# Patient Record
Sex: FEMALE | Race: BLACK OR AFRICAN AMERICAN | NOT HISPANIC OR LATINO | URBAN - METROPOLITAN AREA
[De-identification: names, ages, dates, MRNs, and addresses within clinical notes are randomized per-mention and may not be internally consistent; named-entity substitution may affect disease eponyms.]

---

## 2021-08-02 ENCOUNTER — EMERGENCY (EMERGENCY)
Facility: HOSPITAL | Age: 46
LOS: 1 days | Discharge: HOME | End: 2021-08-02
Attending: EMERGENCY MEDICINE | Admitting: EMERGENCY MEDICINE
Payer: MEDICAID

## 2021-08-02 VITALS
OXYGEN SATURATION: 100 % | RESPIRATION RATE: 20 BRPM | TEMPERATURE: 99 F | HEART RATE: 105 BPM | SYSTOLIC BLOOD PRESSURE: 141 MMHG | DIASTOLIC BLOOD PRESSURE: 86 MMHG

## 2021-08-02 LAB
APPEARANCE UR: CLEAR — SIGNIFICANT CHANGE UP
BACTERIA # UR AUTO: ABNORMAL
BASE EXCESS BLDV CALC-SCNC: 3.3 MMOL/L — HIGH (ref -3–2)
BILIRUB UR-MCNC: NEGATIVE — SIGNIFICANT CHANGE UP
BLOOD GAS VENOUS - CREATININE: 0.9 MG/DL — SIGNIFICANT CHANGE UP (ref 0.5–1.3)
BLOOD GAS VENOUS COMPREHENSIVE RESULT: SIGNIFICANT CHANGE UP
CHLORIDE BLDV-SCNC: 101 MMOL/L — SIGNIFICANT CHANGE UP (ref 96–108)
COLOR SPEC: COLORLESS — SIGNIFICANT CHANGE UP
DIFF PNL FLD: ABNORMAL
EPI CELLS # UR: 1 /HPF — SIGNIFICANT CHANGE UP (ref 0–5)
GAS PNL BLDV: 133 MMOL/L — LOW (ref 136–146)
GLUCOSE BLDV-MCNC: 189 MG/DL — HIGH (ref 70–99)
GLUCOSE UR QL: NEGATIVE — SIGNIFICANT CHANGE UP
HCO3 BLDV-SCNC: 27 MMOL/L — SIGNIFICANT CHANGE UP (ref 20–27)
HCT VFR BLD CALC: 37.6 % — SIGNIFICANT CHANGE UP (ref 34.5–45)
HCT VFR BLDA CALC: 39.8 % — SIGNIFICANT CHANGE UP (ref 34.5–46.5)
HGB BLD CALC-MCNC: 12.9 G/DL — SIGNIFICANT CHANGE UP (ref 11.5–15.5)
HGB BLD-MCNC: 12.5 G/DL — SIGNIFICANT CHANGE UP (ref 11.5–15.5)
HYALINE CASTS # UR AUTO: 0 /LPF — SIGNIFICANT CHANGE UP (ref 0–7)
KETONES UR-MCNC: NEGATIVE — SIGNIFICANT CHANGE UP
LACTATE BLDV-MCNC: 1.7 MMOL/L — SIGNIFICANT CHANGE UP (ref 0.5–2)
LEUKOCYTE ESTERASE UR-ACNC: ABNORMAL
MCHC RBC-ENTMCNC: 27.5 PG — SIGNIFICANT CHANGE UP (ref 27–34)
MCHC RBC-ENTMCNC: 33.2 GM/DL — SIGNIFICANT CHANGE UP (ref 32–36)
MCV RBC AUTO: 82.8 FL — SIGNIFICANT CHANGE UP (ref 80–100)
NITRITE UR-MCNC: NEGATIVE — SIGNIFICANT CHANGE UP
NRBC # BLD: 0 /100 WBCS — SIGNIFICANT CHANGE UP
NRBC # FLD: 0 K/UL — SIGNIFICANT CHANGE UP
PCO2 BLDV: 42 MMHG — SIGNIFICANT CHANGE UP (ref 41–51)
PH BLDV: 7.43 — SIGNIFICANT CHANGE UP (ref 7.32–7.43)
PH UR: 6.5 — SIGNIFICANT CHANGE UP (ref 5–8)
PLATELET # BLD AUTO: 158 K/UL — SIGNIFICANT CHANGE UP (ref 150–400)
PO2 BLDV: 38 MMHG — SIGNIFICANT CHANGE UP (ref 35–40)
POTASSIUM BLDV-SCNC: 2.8 MMOL/L — CRITICAL LOW (ref 3.4–4.5)
PROT UR-MCNC: ABNORMAL
RBC # BLD: 4.54 M/UL — SIGNIFICANT CHANGE UP (ref 3.8–5.2)
RBC # FLD: 12.2 % — SIGNIFICANT CHANGE UP (ref 10.3–14.5)
RBC CASTS # UR COMP ASSIST: 2 /HPF — SIGNIFICANT CHANGE UP (ref 0–4)
SAO2 % BLDV: 67.6 % — SIGNIFICANT CHANGE UP (ref 60–85)
SP GR SPEC: 1.01 — LOW (ref 1.01–1.02)
UROBILINOGEN FLD QL: SIGNIFICANT CHANGE UP
WBC # BLD: 13.28 K/UL — HIGH (ref 3.8–10.5)
WBC # FLD AUTO: 13.28 K/UL — HIGH (ref 3.8–10.5)
WBC UR QL: 30 /HPF — HIGH (ref 0–5)

## 2021-08-02 PROCEDURE — 99284 EMERGENCY DEPT VISIT MOD MDM: CPT

## 2021-08-02 RX ORDER — SODIUM CHLORIDE 9 MG/ML
1000 INJECTION, SOLUTION INTRAVENOUS ONCE
Refills: 0 | Status: COMPLETED | OUTPATIENT
Start: 2021-08-02 | End: 2021-08-02

## 2021-08-02 RX ORDER — CEFTRIAXONE 500 MG/1
1000 INJECTION, POWDER, FOR SOLUTION INTRAMUSCULAR; INTRAVENOUS ONCE
Refills: 0 | Status: COMPLETED | OUTPATIENT
Start: 2021-08-02 | End: 2021-08-02

## 2021-08-02 RX ADMIN — SODIUM CHLORIDE 1000 MILLILITER(S): 9 INJECTION, SOLUTION INTRAVENOUS at 22:21

## 2021-08-02 NOTE — ED PROVIDER NOTE - NSFOLLOWUPINSTRUCTIONS_ED_ALL_ED_FT
YOU WERE SEEN IN THE ED FOR: urinary symptoms     YOUR POTASSIUM WAS LOW. IT WAS REPLETED.    YOU WERE PRESCRIBED: CEFUROXIME  FOLLOW THE INSTRUCTIONS ON THE LABEL/CONTAINER    FOR PAIN/FEVER, YOU MAY TAKE TYLENOL (acetaminophen) AND/OR IBUPROFEN (advil or motrin). FOLLOW THE INSTRUCTIONS ON THE LABEL/CONTAINER.    PLEASE FOLLOW UP WITH YOUR PRIVATE PHYSICIAN WITHIN THE NEXT 48 HOURS. BRING COPIES OF YOUR RESULTS.    RETURN TO THE EMERGENCY DEPARTMENT IF YOU EXPERIENCE ANY NEW/CONCERNING/WORSENING SYMPTOMS.

## 2021-08-02 NOTE — ED PROVIDER NOTE - PHYSICAL EXAMINATION
Alert and oriented, NAD   HEENT nml   perrl 2-12 intact   neck supple   heart s1s2,   lungs clear,   abd soft mild suprapubic tenderness,   no CVA tenderness   no rashes, motor 5/5. sensation intact.

## 2021-08-02 NOTE — ED ADULT NURSE NOTE - OBJECTIVE STATEMENT
PT is a 46 year old female reporting to the ED for headache, chills since she received 2nd dose pfizer on 7/26. Went to urgent care today and told to come to ED for fever, received 1000 mg tylenol at 1800 at urgent care which relieved fever and headache. Pt is AOX4. Pt denies chest pain or SOB. PT respirations even an unlabored. Pt appears to be comfortable, in NAD. Pt denies n/v/d. Pt denies abdominal pain, dysuria, hematuria. 20 g iv placed in r ac, labs drawn, pending review, will continue to monitor.

## 2021-08-02 NOTE — ED ADULT TRIAGE NOTE - CHIEF COMPLAINT QUOTE
received 2nd dose pfizer on 7/26, c/o fever chills myalgias and HA x 1 week, went to [facility] care and was told to come to ED for further eval  received 1000mg tylenol at 18:00

## 2021-08-02 NOTE — ED PROVIDER NOTE - OBJECTIVE STATEMENT
46 yr old female c/o 1 week hx of chills body aches, headache with fever, dysuria and frequency, recent covid vaccine july 24. Seen at [facility] with temp 103, neg covid swab, positive urine. No hx of UTI, no vag discharge, no flank pain, nausea or vomiting.  No headache at this time

## 2021-08-02 NOTE — ED PROVIDER NOTE - ATTENDING CONTRIBUTION TO CARE
I performed the initial face to face bedside interview with this patient regarding history of present illness, review of symptoms and past medical, social and family history.  I completed an independent physical examination.  I was the initial provider who evaluated this patient.  The history, review of symptoms and examination was documented by me.  I have discussed the patient’s plan of care and disposition with the resident. Dr Bloch-I performed the initial face to face bedside interview with this patient regarding history of present illness, review of symptoms and past medical, social and family history.  I completed an independent physical examination.  I was the initial provider who evaluated this patient.  The history, review of symptoms and examination was documented by me.  I have discussed the patient’s plan of care and disposition with the resident.

## 2021-08-02 NOTE — ED PROVIDER NOTE - CLINICAL SUMMARY MEDICAL DECISION MAKING FREE TEXT BOX
46 yr old female with fever, HA, dysuria, with recent 2nd dose of COVID vaccine, concern for pyelonephritis, viral syndrome,  labs , IV fluids, ua reassess

## 2021-08-02 NOTE — ED PROVIDER NOTE - PROGRESS NOTE DETAILS
Tarik Pham D.O., PGY3 (Resident)  Patient feels improved. Results endorsed. Potassium repleted. Return precautions given. Patient expressed verbal understanding. All questions answered.  Will DC with outpatient follow-up.

## 2021-08-02 NOTE — ED PROVIDER NOTE - PATIENT PORTAL LINK FT
You can access the FollowMyHealth Patient Portal offered by [facility] by registering at the following website: [email or web address]. By joining [facility]’s FollowMyHealth portal, you will also be able to view your health information using other applications (apps) compatible with our system.

## 2021-08-02 NOTE — ED ADULT NURSE NOTE - CHIEF COMPLAINT QUOTE
received 2nd dose pfizer on 7/26, c/o fever chills myalgias and HA x 1 week, went to [facility] care and was told to come to ED for further eval  received 1000mg tylenol at 18:00

## 2021-08-03 VITALS
OXYGEN SATURATION: 100 % | TEMPERATURE: 98 F | DIASTOLIC BLOOD PRESSURE: 88 MMHG | RESPIRATION RATE: 18 BRPM | SYSTOLIC BLOOD PRESSURE: 124 MMHG | HEART RATE: 89 BPM

## 2021-08-03 LAB
ALBUMIN SERPL ELPH-MCNC: 4.3 G/DL — SIGNIFICANT CHANGE UP (ref 3.3–5)
ALP SERPL-CCNC: 79 U/L — SIGNIFICANT CHANGE UP (ref 40–120)
ALT FLD-CCNC: 31 U/L — SIGNIFICANT CHANGE UP (ref 4–33)
ANION GAP SERPL CALC-SCNC: 15 MMOL/L — HIGH (ref 7–14)
AST SERPL-CCNC: 25 U/L — SIGNIFICANT CHANGE UP (ref 4–32)
B PERT DNA SPEC QL NAA+PROBE: SIGNIFICANT CHANGE UP
BILIRUB SERPL-MCNC: 0.6 MG/DL — SIGNIFICANT CHANGE UP (ref 0.2–1.2)
BUN SERPL-MCNC: 14 MG/DL — SIGNIFICANT CHANGE UP (ref 7–23)
C PNEUM DNA SPEC QL NAA+PROBE: SIGNIFICANT CHANGE UP
CALCIUM SERPL-MCNC: 9.3 MG/DL — SIGNIFICANT CHANGE UP (ref 8.4–10.5)
CHLORIDE SERPL-SCNC: 96 MMOL/L — LOW (ref 98–107)
CO2 SERPL-SCNC: 23 MMOL/L — SIGNIFICANT CHANGE UP (ref 22–31)
CREAT SERPL-MCNC: 0.82 MG/DL — SIGNIFICANT CHANGE UP (ref 0.5–1.3)
FLUAV H1 2009 PAND RNA SPEC QL NAA+PROBE: SIGNIFICANT CHANGE UP
FLUAV H1 RNA SPEC QL NAA+PROBE: SIGNIFICANT CHANGE UP
FLUAV H3 RNA SPEC QL NAA+PROBE: SIGNIFICANT CHANGE UP
FLUAV SUBTYP SPEC NAA+PROBE: SIGNIFICANT CHANGE UP
FLUBV RNA SPEC QL NAA+PROBE: SIGNIFICANT CHANGE UP
GLUCOSE SERPL-MCNC: 178 MG/DL — HIGH (ref 70–99)
HADV DNA SPEC QL NAA+PROBE: SIGNIFICANT CHANGE UP
HCOV 229E RNA SPEC QL NAA+PROBE: SIGNIFICANT CHANGE UP
HCOV HKU1 RNA SPEC QL NAA+PROBE: SIGNIFICANT CHANGE UP
HCOV NL63 RNA SPEC QL NAA+PROBE: SIGNIFICANT CHANGE UP
HCOV OC43 RNA SPEC QL NAA+PROBE: SIGNIFICANT CHANGE UP
HMPV RNA SPEC QL NAA+PROBE: SIGNIFICANT CHANGE UP
HPIV1 RNA SPEC QL NAA+PROBE: SIGNIFICANT CHANGE UP
HPIV2 RNA SPEC QL NAA+PROBE: SIGNIFICANT CHANGE UP
HPIV3 RNA SPEC QL NAA+PROBE: SIGNIFICANT CHANGE UP
HPIV4 RNA SPEC QL NAA+PROBE: SIGNIFICANT CHANGE UP
POTASSIUM SERPL-MCNC: 3 MMOL/L — LOW (ref 3.5–5.3)
POTASSIUM SERPL-SCNC: 3 MMOL/L — LOW (ref 3.5–5.3)
PROT SERPL-MCNC: 8.1 G/DL — SIGNIFICANT CHANGE UP (ref 6–8.3)
RAPID RVP RESULT: SIGNIFICANT CHANGE UP
RSV RNA SPEC QL NAA+PROBE: SIGNIFICANT CHANGE UP
RV+EV RNA SPEC QL NAA+PROBE: SIGNIFICANT CHANGE UP
SARS-COV-2 RNA SPEC QL NAA+PROBE: SIGNIFICANT CHANGE UP
SODIUM SERPL-SCNC: 134 MMOL/L — LOW (ref 135–145)

## 2021-08-03 RX ORDER — CEFUROXIME AXETIL 250 MG
1 TABLET ORAL
Qty: 14 | Refills: 0
Start: 2021-08-03 | End: 2021-08-09

## 2021-08-03 RX ORDER — POTASSIUM CHLORIDE 20 MEQ
10 PACKET (EA) ORAL
Refills: 0 | Status: COMPLETED | OUTPATIENT
Start: 2021-08-03 | End: 2021-08-03

## 2021-08-03 RX ORDER — POTASSIUM CHLORIDE 20 MEQ
40 PACKET (EA) ORAL ONCE
Refills: 0 | Status: COMPLETED | OUTPATIENT
Start: 2021-08-03 | End: 2021-08-03

## 2021-08-03 RX ADMIN — Medication 100 MILLIEQUIVALENT(S): at 00:23

## 2021-08-03 RX ADMIN — Medication 40 MILLIEQUIVALENT(S): at 00:23

## 2021-08-03 RX ADMIN — CEFTRIAXONE 100 MILLIGRAM(S): 500 INJECTION, POWDER, FOR SOLUTION INTRAMUSCULAR; INTRAVENOUS at 00:01

## 2021-08-06 LAB
-  ESBL: SIGNIFICANT CHANGE UP
E COLI DNA BLD POS QL NAA+NON-PROBE: SIGNIFICANT CHANGE UP
GRAM STN FLD: SIGNIFICANT CHANGE UP
METHOD TYPE: SIGNIFICANT CHANGE UP

## 2021-08-06 RX ORDER — NITROFURANTOIN MACROCRYSTAL 50 MG
1 CAPSULE ORAL
Qty: 14 | Refills: 0
Start: 2021-08-06 | End: 2021-08-12

## 2021-08-06 NOTE — ED POST DISCHARGE NOTE - RESULT SUMMARY
PA orrico: + UTI E coli ESBL. SW ID macrobid sensitive will give 7 days and dc the  cefoxitin.  If any worsening sx's will return to ED> FEeling ipmrovement from ED visit.  Will repeat a urine once AB done with her pcp

## 2021-08-06 NOTE — ED POST DISCHARGE NOTE - DETAILS

## 2021-08-08 ENCOUNTER — INPATIENT (INPATIENT)
Facility: HOSPITAL | Age: 46
LOS: 1 days | Discharge: HOME HEALTH | End: 2021-08-10
Attending: INTERNAL MEDICINE | Admitting: INTERNAL MEDICINE
Payer: MEDICAID

## 2021-08-08 VITALS
DIASTOLIC BLOOD PRESSURE: 72 MMHG | HEART RATE: 75 BPM | RESPIRATION RATE: 16 BRPM | TEMPERATURE: 97 F | SYSTOLIC BLOOD PRESSURE: 115 MMHG | OXYGEN SATURATION: 100 %

## 2021-08-08 DIAGNOSIS — R78.81 BACTEREMIA: ICD-10-CM

## 2021-08-08 DIAGNOSIS — Z29.9 ENCOUNTER FOR PROPHYLACTIC MEASURES, UNSPECIFIED: ICD-10-CM

## 2021-08-08 DIAGNOSIS — E87.6 HYPOKALEMIA: ICD-10-CM

## 2021-08-08 DIAGNOSIS — N39.0 URINARY TRACT INFECTION, SITE NOT SPECIFIED: ICD-10-CM

## 2021-08-08 LAB
-  AMIKACIN: SIGNIFICANT CHANGE UP
-  AMPICILLIN/SULBACTAM: SIGNIFICANT CHANGE UP
-  AMPICILLIN: SIGNIFICANT CHANGE UP
-  AZTREONAM: SIGNIFICANT CHANGE UP
-  CEFAZOLIN: SIGNIFICANT CHANGE UP
-  CEFEPIME: SIGNIFICANT CHANGE UP
-  CEFOXITIN: SIGNIFICANT CHANGE UP
-  CEFTRIAXONE: SIGNIFICANT CHANGE UP
-  CIPROFLOXACIN: SIGNIFICANT CHANGE UP
-  ERTAPENEM: SIGNIFICANT CHANGE UP
-  GENTAMICIN: SIGNIFICANT CHANGE UP
-  IMIPENEM: SIGNIFICANT CHANGE UP
-  LEVOFLOXACIN: SIGNIFICANT CHANGE UP
-  MEROPENEM: SIGNIFICANT CHANGE UP
-  PIPERACILLIN/TAZOBACTAM: SIGNIFICANT CHANGE UP
-  TOBRAMYCIN: SIGNIFICANT CHANGE UP
-  TRIMETHOPRIM/SULFAMETHOXAZOLE: SIGNIFICANT CHANGE UP
ALBUMIN SERPL ELPH-MCNC: 4.1 G/DL — SIGNIFICANT CHANGE UP (ref 3.3–5)
ALP SERPL-CCNC: 83 U/L — SIGNIFICANT CHANGE UP (ref 40–120)
ALT FLD-CCNC: 19 U/L — SIGNIFICANT CHANGE UP (ref 4–33)
ANION GAP SERPL CALC-SCNC: 13 MMOL/L — SIGNIFICANT CHANGE UP (ref 7–14)
APPEARANCE UR: CLEAR — SIGNIFICANT CHANGE UP
AST SERPL-CCNC: 16 U/L — SIGNIFICANT CHANGE UP (ref 4–32)
BASOPHILS # BLD AUTO: 0.02 K/UL — SIGNIFICANT CHANGE UP (ref 0–0.2)
BASOPHILS NFR BLD AUTO: 0.4 % — SIGNIFICANT CHANGE UP (ref 0–2)
BILIRUB SERPL-MCNC: 0.4 MG/DL — SIGNIFICANT CHANGE UP (ref 0.2–1.2)
BILIRUB UR-MCNC: NEGATIVE — SIGNIFICANT CHANGE UP
BLOOD GAS VENOUS COMPREHENSIVE RESULT: SIGNIFICANT CHANGE UP
BUN SERPL-MCNC: 20 MG/DL — SIGNIFICANT CHANGE UP (ref 7–23)
CALCIUM SERPL-MCNC: 9.4 MG/DL — SIGNIFICANT CHANGE UP (ref 8.4–10.5)
CHLORIDE SERPL-SCNC: 98 MMOL/L — SIGNIFICANT CHANGE UP (ref 98–107)
CO2 SERPL-SCNC: 26 MMOL/L — SIGNIFICANT CHANGE UP (ref 22–31)
COLOR SPEC: SIGNIFICANT CHANGE UP
CREAT SERPL-MCNC: 0.79 MG/DL — SIGNIFICANT CHANGE UP (ref 0.5–1.3)
CULTURE RESULTS: SIGNIFICANT CHANGE UP
CULTURE RESULTS: SIGNIFICANT CHANGE UP
DIFF PNL FLD: NEGATIVE — SIGNIFICANT CHANGE UP
EOSINOPHIL # BLD AUTO: 0.06 K/UL — SIGNIFICANT CHANGE UP (ref 0–0.5)
EOSINOPHIL NFR BLD AUTO: 1.3 % — SIGNIFICANT CHANGE UP (ref 0–6)
GLUCOSE SERPL-MCNC: 90 MG/DL — SIGNIFICANT CHANGE UP (ref 70–99)
GLUCOSE UR QL: NEGATIVE — SIGNIFICANT CHANGE UP
HCT VFR BLD CALC: 37.5 % — SIGNIFICANT CHANGE UP (ref 34.5–45)
HGB BLD-MCNC: 12.4 G/DL — SIGNIFICANT CHANGE UP (ref 11.5–15.5)
IANC: 2.53 K/UL — SIGNIFICANT CHANGE UP (ref 1.5–8.5)
IMM GRANULOCYTES NFR BLD AUTO: 0.6 % — SIGNIFICANT CHANGE UP (ref 0–1.5)
KETONES UR-MCNC: NEGATIVE — SIGNIFICANT CHANGE UP
LEUKOCYTE ESTERASE UR-ACNC: NEGATIVE — SIGNIFICANT CHANGE UP
LYMPHOCYTES # BLD AUTO: 1.62 K/UL — SIGNIFICANT CHANGE UP (ref 1–3.3)
LYMPHOCYTES # BLD AUTO: 34.2 % — SIGNIFICANT CHANGE UP (ref 13–44)
MCHC RBC-ENTMCNC: 27.6 PG — SIGNIFICANT CHANGE UP (ref 27–34)
MCHC RBC-ENTMCNC: 33.1 GM/DL — SIGNIFICANT CHANGE UP (ref 32–36)
MCV RBC AUTO: 83.5 FL — SIGNIFICANT CHANGE UP (ref 80–100)
METHOD TYPE: SIGNIFICANT CHANGE UP
MONOCYTES # BLD AUTO: 0.47 K/UL — SIGNIFICANT CHANGE UP (ref 0–0.9)
MONOCYTES NFR BLD AUTO: 9.9 % — SIGNIFICANT CHANGE UP (ref 2–14)
NEUTROPHILS # BLD AUTO: 2.53 K/UL — SIGNIFICANT CHANGE UP (ref 1.8–7.4)
NEUTROPHILS NFR BLD AUTO: 53.6 % — SIGNIFICANT CHANGE UP (ref 43–77)
NITRITE UR-MCNC: NEGATIVE — SIGNIFICANT CHANGE UP
NRBC # BLD: 0 /100 WBCS — SIGNIFICANT CHANGE UP
NRBC # FLD: 0 K/UL — SIGNIFICANT CHANGE UP
ORGANISM # SPEC MICROSCOPIC CNT: SIGNIFICANT CHANGE UP
PH UR: 7 — SIGNIFICANT CHANGE UP (ref 5–8)
PLATELET # BLD AUTO: 256 K/UL — SIGNIFICANT CHANGE UP (ref 150–400)
POTASSIUM SERPL-MCNC: 3.2 MMOL/L — LOW (ref 3.5–5.3)
POTASSIUM SERPL-SCNC: 3.2 MMOL/L — LOW (ref 3.5–5.3)
PROT SERPL-MCNC: 7.6 G/DL — SIGNIFICANT CHANGE UP (ref 6–8.3)
PROT UR-MCNC: NEGATIVE — SIGNIFICANT CHANGE UP
RBC # BLD: 4.49 M/UL — SIGNIFICANT CHANGE UP (ref 3.8–5.2)
RBC # FLD: 12.2 % — SIGNIFICANT CHANGE UP (ref 10.3–14.5)
SARS-COV-2 RNA SPEC QL NAA+PROBE: SIGNIFICANT CHANGE UP
SODIUM SERPL-SCNC: 137 MMOL/L — SIGNIFICANT CHANGE UP (ref 135–145)
SP GR SPEC: 1.01 — SIGNIFICANT CHANGE UP (ref 1.01–1.02)
SPECIMEN SOURCE: SIGNIFICANT CHANGE UP
SPECIMEN SOURCE: SIGNIFICANT CHANGE UP
UROBILINOGEN FLD QL: SIGNIFICANT CHANGE UP
WBC # BLD: 4.73 K/UL — SIGNIFICANT CHANGE UP (ref 3.8–10.5)
WBC # FLD AUTO: 4.73 K/UL — SIGNIFICANT CHANGE UP (ref 3.8–10.5)

## 2021-08-08 PROCEDURE — 99285 EMERGENCY DEPT VISIT HI MDM: CPT

## 2021-08-08 PROCEDURE — 99222 1ST HOSP IP/OBS MODERATE 55: CPT

## 2021-08-08 RX ORDER — ACETAMINOPHEN 500 MG
650 TABLET ORAL EVERY 6 HOURS
Refills: 0 | Status: DISCONTINUED | OUTPATIENT
Start: 2021-08-08 | End: 2021-08-10

## 2021-08-08 RX ORDER — ERTAPENEM SODIUM 1 G/1
1000 INJECTION, POWDER, LYOPHILIZED, FOR SOLUTION INTRAMUSCULAR; INTRAVENOUS EVERY 24 HOURS
Refills: 0 | Status: DISCONTINUED | OUTPATIENT
Start: 2021-08-09 | End: 2021-08-10

## 2021-08-08 RX ORDER — ERTAPENEM SODIUM 1 G/1
1000 INJECTION, POWDER, LYOPHILIZED, FOR SOLUTION INTRAMUSCULAR; INTRAVENOUS ONCE
Refills: 0 | Status: COMPLETED | OUTPATIENT
Start: 2021-08-08 | End: 2021-08-08

## 2021-08-08 RX ORDER — ENOXAPARIN SODIUM 100 MG/ML
40 INJECTION SUBCUTANEOUS DAILY
Refills: 0 | Status: DISCONTINUED | OUTPATIENT
Start: 2021-08-08 | End: 2021-08-10

## 2021-08-08 RX ORDER — POTASSIUM CHLORIDE 20 MEQ
40 PACKET (EA) ORAL ONCE
Refills: 0 | Status: COMPLETED | OUTPATIENT
Start: 2021-08-08 | End: 2021-08-08

## 2021-08-08 RX ADMIN — Medication 40 MILLIEQUIVALENT(S): at 17:03

## 2021-08-08 RX ADMIN — ERTAPENEM SODIUM 120 MILLIGRAM(S): 1 INJECTION, POWDER, LYOPHILIZED, FOR SOLUTION INTRAMUSCULAR; INTRAVENOUS at 16:46

## 2021-08-08 NOTE — H&P ADULT - HISTORY OF PRESENT ILLNESS
46 year old female with no significant history except for a recent UTI is here for bacteremia. Patient had her second COVID pfizer vaccine on 07/26 and she started having headache, bodyache, chills. She thought she had COVID and went to the urgent care. At urgent care, she was COVID and flu negative but was febrile and found to have UTI and was sent to [facility] ER for treatment. She endorsed dysuria, urinary frequency, urgency and back pain. She was discharged home on cefuroxime. She was doing well when she received a call yesterday stating that her antibiotic will be changed to macrobid and that she has bacteremia and to return to the emergency room for IV antibiotics. Patient states that her urinary symptoms resolved. She still feels a 3-4/10 constant headache, takes tylenol PRN and has mild dizziness and feels lightheaded. Denies fever, chills, chest pain, palpitation, SOB, N/V, abdominal pain. No other hx of UTI. Blood cultures from 08/02 showed ESBL e.coli. Urine culture from 08/03 showed 50-99k ESBL e.coli. She received ertapenem 1 gm x1. Patient is being admitted for e.coli bacteremia.      46F with GERD/H pylori s/p treatment (1+ yr prior) recent presentation for UTI sx dc on PO abx called back for ESBL E coli bacteremia. Patient had her second COVID pfizer vaccine on 07/26 and she started having headache, body ache chills. She thought she had COVID and went to the urgent care. At urgent care, she was COVID and flu negative but was febrile and found to have UTI and was sent to [facility] ER for treatment. She endorsed dysuria, urinary frequency, urgency and back pain (does not recall side). She was discharged home on cefuroxime. She was doing well when she received a call yesterday stating that her antibiotic will be changed to macrobid and that she has bacteremia and to return to the emergency room for IV antibiotics. Patient states that her urinary symptoms resolved. Never started the macrobid that was prescribed.  She still feels a 3-4/10 constant headache, takes Tylenol PRN and has mild dizziness and feels lightheaded. Denies fever, chills, chest pain, palpitation, SOB, N/V, abdominal pain. No other hx of UTI.  No recent abx use.    Blood cultures from 08/02 showed ESBL e.coli. Urine culture from 08/03 showed 50-99k ESBL e.coli. She received ertapenem 1 gm x1. Patient is being admitted for e.coli bacteremia.

## 2021-08-08 NOTE — ED PROVIDER NOTE - ATTENDING CONTRIBUTION TO CARE
Attending note:   After face to face evaluation of this patient, I concur with above noted hx, pe, and care plan for this patient.  Vu: 46 yof with recent UTI treated with oral antibiotics but then changed to another. Pt then called for positive blood culture. Pt initially had headache, fever, dysuria but had also received recent covid vaccine and attributed sxs to that. Now symptoms have improved but still present. Pt is well appearing, no distress, no meningismus, clear lungs, RRR, abd soft and non tender, no edema, no CVAT, no suprapubic tn. Pt with known bacteremia - but now feeling better, likely needs IV antibxs but will discuss with ID for treatment of ESBL.

## 2021-08-08 NOTE — H&P ADULT - NSHPSOCIALHISTORY_GEN_ALL_CORE
Denies ETOH use/abuse, smoking or illegal drug use.     Family history:  Mother - HTN  Father - brain tumor Denies ETOH use/abuse, smoking or illegal drug use.

## 2021-08-08 NOTE — H&P ADULT - ASSESSMENT
46 year old female with no significant history except for a recent UTI is here for bacteremia. 46F no significant history except for a recent UTI is here for ESBL E.coli bacteremia.

## 2021-08-08 NOTE — H&P ADULT - ATTENDING COMMENTS
Patient seen and examined care d/w ACP    In summary 47 yo F with GERD 2/2 H Pylori s/p treatment 1 year prior who presented 8/2 with fever and dysuria dx UTI and dc on cefuroxime now called back with urine and blood cultures were positive for ESBL bacteremia due to urinary source.    # ESBL Bacteremia/UTI/Pyelo:  despite abx being not sensitive pt has had clinical improvement, denies further fevers, dysuria, or flank pain; no prior UTI, no recent abx use or use multiple times (last for H pylori); WBC resolved.  No oral options for abx.  - c/w ertapenem D1 (8/8)  - f/u repeat blood cultures from 8/8  - would curbsided ID re: duration, classically treated for 10-14 days for bacteremia however pt oddly clinically improved on abx that did not "cover" her infxn, if rec 10d course will need midline and CM re: insurance coverage for ertapenem     # Hypokalemia: repleted    No PT needs, low improve, no chemical ppx ambulatory

## 2021-08-08 NOTE — ED PROVIDER NOTE - OBJECTIVE STATEMENT
46yF w/no stated pmhx called back to ED with positive blood culture drawn on 8/02. Pt was seen in the ED on 8/02 with fever, headache, dysuria and diagnosed with UTI discharged of cefuroxime. Urine culture results ESBL+, antibiotic changed to Macrobid. Pt was called on 8/06 with positive blood culture results (gram negative rods in anaerobic bottle) and instructed to return to the ED. Pt states she has been afebrile since ED visit on 8/02, improvement in urinary complaints although she continues to have mild headache, dizziness and generalized weakness.

## 2021-08-08 NOTE — ED ADULT NURSE REASSESSMENT NOTE - NS ED NURSE REASSESS COMMENT FT1
Pt in NAD, breathing even and unlabored, afebrile, denies chest pain, no headache/dizziness, able to tolerate PO, IVL is clear and patent, ambulatory without assist. Will continue to monitor.

## 2021-08-08 NOTE — H&P ADULT - PROBLEM SELECTOR PLAN 1
- patient with a recent UTI is here for ESBL e.coli bacteremia   - likely due to a recent UTI  - repeat UA negative for infection  - urinary symptoms now resolved   - continue ertapenem   - repeat blood cultures x2 sent on 08/08   - call house ID consult in AM

## 2021-08-08 NOTE — H&P ADULT - NSHPPHYSICALEXAM_GEN_ALL_CORE
T(C): 36.4 (08-08-21 @ 18:07), Max: 36.6 (08-08-21 @ 15:30)  HR: 73 (08-08-21 @ 18:07) (71 - 75)  BP: 128/65 (08-08-21 @ 18:07) (115/72 - 132/71)  RR: 16 (08-08-21 @ 18:07) (16 - 16)  SpO2: 100% (08-08-21 @ 18:07) (100% - 100%)

## 2021-08-08 NOTE — ED PROVIDER NOTE - CLINICAL SUMMARY MEDICAL DECISION MAKING FREE TEXT BOX
46yF w/no stated pmhx called back to ED with positive blood culture drawn on 8/02. Pt seen in ED on 8/02 dx with UTI prescribed cefuroxime, changed to Macrobid when urine culture resulted ESBL positive, blood culture terri positive for gram negative rods. Pt reports improvement in some symptoms, afebrile, no urinary complaints or back pain, continues to have mild headache and lightheadedness with fatigue. Plan: cbc/cmp, ua/ucx, rpt blood cultures, will discuss with ID

## 2021-08-08 NOTE — ED PROVIDER NOTE - PROGRESS NOTE DETAILS
SERVANDO Fermin: Paged ID for consult SERVANDO Fermin: Spoke with ID, recommending ertapenem and admission awaiting blood culture results, discussed with pt who agrees with this plan. Hospitalist paged SERVANDO Fermin: Spoke with hospitalist who accepts pt, text paged MAR

## 2021-08-08 NOTE — ED ADULT NURSE NOTE - OBJECTIVE STATEMENT
Pt a&ox3 called back for + blood cultures, pt recently receiving treatment for uti, breathing even and unlabored, afebrile, denies chest pain, no headache/dizziness, abd soft, non-tender, non-distended, skin is cool dry and intact, ivl placed, labs collected and sent.

## 2021-08-08 NOTE — ED PROVIDER NOTE - CARE PLAN
Principal Discharge DX:	UTI (urinary tract infection)  Secondary Diagnosis:	ESBL (extended spectrum beta-lactamase) producing bacteria infection  Secondary Diagnosis:	Bacteremia

## 2021-08-09 LAB
ANION GAP SERPL CALC-SCNC: 12 MMOL/L — SIGNIFICANT CHANGE UP (ref 7–14)
BUN SERPL-MCNC: 17 MG/DL — SIGNIFICANT CHANGE UP (ref 7–23)
CALCIUM SERPL-MCNC: 9.3 MG/DL — SIGNIFICANT CHANGE UP (ref 8.4–10.5)
CHLORIDE SERPL-SCNC: 98 MMOL/L — SIGNIFICANT CHANGE UP (ref 98–107)
CO2 SERPL-SCNC: 25 MMOL/L — SIGNIFICANT CHANGE UP (ref 22–31)
COVID-19 SPIKE DOMAIN AB INTERP: POSITIVE
COVID-19 SPIKE DOMAIN ANTIBODY RESULT: >250 U/ML — HIGH
CREAT SERPL-MCNC: 0.68 MG/DL — SIGNIFICANT CHANGE UP (ref 0.5–1.3)
GLUCOSE SERPL-MCNC: 115 MG/DL — HIGH (ref 70–99)
HCT VFR BLD CALC: 35.6 % — SIGNIFICANT CHANGE UP (ref 34.5–45)
HGB BLD-MCNC: 12 G/DL — SIGNIFICANT CHANGE UP (ref 11.5–15.5)
MAGNESIUM SERPL-MCNC: 2.1 MG/DL — SIGNIFICANT CHANGE UP (ref 1.6–2.6)
MCHC RBC-ENTMCNC: 27.8 PG — SIGNIFICANT CHANGE UP (ref 27–34)
MCHC RBC-ENTMCNC: 33.7 GM/DL — SIGNIFICANT CHANGE UP (ref 32–36)
MCV RBC AUTO: 82.4 FL — SIGNIFICANT CHANGE UP (ref 80–100)
NRBC # BLD: 0 /100 WBCS — SIGNIFICANT CHANGE UP
NRBC # FLD: 0 K/UL — SIGNIFICANT CHANGE UP
PHOSPHATE SERPL-MCNC: 4.3 MG/DL — SIGNIFICANT CHANGE UP (ref 2.5–4.5)
PLATELET # BLD AUTO: 236 K/UL — SIGNIFICANT CHANGE UP (ref 150–400)
POTASSIUM SERPL-MCNC: 3.3 MMOL/L — LOW (ref 3.5–5.3)
POTASSIUM SERPL-SCNC: 3.3 MMOL/L — LOW (ref 3.5–5.3)
RBC # BLD: 4.32 M/UL — SIGNIFICANT CHANGE UP (ref 3.8–5.2)
RBC # FLD: 12.3 % — SIGNIFICANT CHANGE UP (ref 10.3–14.5)
SARS-COV-2 IGG+IGM SERPL QL IA: >250 U/ML — HIGH
SARS-COV-2 IGG+IGM SERPL QL IA: POSITIVE
SODIUM SERPL-SCNC: 135 MMOL/L — SIGNIFICANT CHANGE UP (ref 135–145)
WBC # BLD: 4.58 K/UL — SIGNIFICANT CHANGE UP (ref 3.8–10.5)
WBC # FLD AUTO: 4.58 K/UL — SIGNIFICANT CHANGE UP (ref 3.8–10.5)

## 2021-08-09 PROCEDURE — 99221 1ST HOSP IP/OBS SF/LOW 40: CPT

## 2021-08-09 RX ADMIN — ERTAPENEM SODIUM 120 MILLIGRAM(S): 1 INJECTION, POWDER, LYOPHILIZED, FOR SOLUTION INTRAMUSCULAR; INTRAVENOUS at 17:47

## 2021-08-09 NOTE — CONSULT NOTE ADULT - ASSESSMENT
45yo F with history of HTN, H. pylori s/p therapy, sinus infection in February called back to hospital for ESBL E. coli bacteremia.    #ESBL E. coli bacteremia  -S/p 3d of cefuroxime and 2d of macrobid  -1/4 blood cultures + from 8/2  -Repeat blood cx pending  -C/w ertapenem 1g qd  -Will discuss if oral options exist (?cefuroxime)  -Would treat for 10d        *Recommendations pending d/w attending physician Dr. Alcala 45yo F with history of HTN, H. pylori s/p therapy, sinus infection in February called back to hospital for ESBL E. coli bacteremia.    #ESBL E. coli bacteremia  -S/p 3d of cefuroxime and 2d of macrobid  -1/4 blood cultures + from 8/2  -Repeat blood cx pending  -C/w ertapenem 1g qd  -Will need midline  -Would treat for 10d        *Recommendations pending d/w attending physician Dr. Alcala 45yo F with history of HTN, H. pylori s/p therapy, sinus infection in February called back to hospital for ESBL E. coli bacteremia.    #ESBL E. coli bacteremia  -S/p 3d of cefuroxime and 2d of macrobid  -1/4 blood cultures + from 8/2  -Repeat blood cx pending  -C/w ertapenem 1g qd  -Will need midline        *Recommendations pending d/w attending physician Dr. Alcala 45yo F with history of HTN, H. pylori s/p therapy, sinus infection in February called back to hospital for ESBL E. coli bacteremia.    #ESBL E. coli bacteremia  -S/p 3d of cefuroxime and 2d of macrobid  -1/4 blood cultures + from 8/2  -Repeat blood cx pending from 8/8  -C/w ertapenem 1g qd  -Will need midline        *Recommendations pending d/w attending physician Dr. Alcala 47yo F with history of HTN, H. pylori s/p therapy, sinus infection in February called back to hospital for ESBL E. coli bacteremia.    #ESBL E. coli bacteremia  -S/p 3d of cefuroxime and 2d of macrobid  -1/4 blood cultures + from 8/2  -Repeat blood cx pending from 8/8  -C/w ertapenem 1g qd  -Will need midline  -Plan for 7d course (8/8-8/14)        D/w attending physician Dr. Alcala

## 2021-08-09 NOTE — CONSULT NOTE ADULT - SUBJECTIVE AND OBJECTIVE BOX
Patient is a 46y old  Female who presents with a chief complaint of bacteremia (08 Aug 2021 17:46)    HPI:  46F with GERD/H pylori s/p treatment (1+ yr prior) recent presentation for UTI sx dc on PO abx called back for ESBL E coli bacteremia. Patient had her second COVID pfizer vaccine on  and she started having headache, body ache chills. She thought she had COVID and went to the urgent care. At urgent care, she was COVID and flu negative but was febrile and found to have UTI and was sent to [facility] ER for treatment. She endorsed dysuria, urinary frequency, urgency and back pain (does not recall side). She was discharged home on cefuroxime. She was doing well when she received a call yesterday stating that her antibiotic will be changed to macrobid and that she has bacteremia and to return to the emergency room for IV antibiotics. Patient states that her urinary symptoms resolved. Never started the macrobid that was prescribed.  She still feels a 3-4/10 constant headache, takes Tylenol PRN and has mild dizziness and feels lightheaded. Denies fever, chills, chest pain, palpitation, SOB, N/V, abdominal pain. No other hx of UTI.  No recent abx use.    Blood cultures from  showed ESBL e.coli. Urine culture from  showed 50-99k ESBL e.coli. She received ertapenem 1 gm x1. Patient is being admitted for e.coli bacteremia.      (08 Aug 2021 17:46)       Very pleasant lady with history of HTN, recently seen in ED for sepsis with believed urinary source, discharged on cefuroxime that was subsequently switched to macrobid after sensitivities returned. On day4 1/4 blood cultures with ESBL E. coli, called to return to ED. Reports 90% improvement since antibiotics were started. No further dysuria, fevers, or chills. Reports mild HA.    prior hospital charts reviewed [ X ]  primary team notes reviewed [  X]      PAST MEDICAL & SURGICAL HISTORY:  No pertinent past medical history    No significant past surgical history        Allergies  Allergy Status Unknown    ANTIMICROBIALS (past 90 days)  MEDICATIONS  (STANDING):  ertapenem  IVPB   120 mL/Hr IV Intermittent (21 @ 16:46)        ertapenem  IVPB 1000 every 24 hours    OTHER MEDS: MEDICATIONS  (STANDING):  acetaminophen   Tablet .. 650 every 6 hours PRN  enoxaparin Injectable 40 daily    SOCIAL HISTORY:   Denies ETOH use/abuse, smoking or illegal drug use. (08 Aug 2021 17:46)      FAMILY HISTORY:  No pertinent family history in first degree relatives      REVIEW OF SYSTEMS  [  ] ROS unobtainable because:    [ X ] All other systems negative except as noted below:	    Constitutional:  [ ] fever [ ] chills  [ ] weight loss  [ ] weakness  Skin:  [ ] rash [ ] phlebitis	  Eyes: [ ] icterus [ ] pain  [ ] discharge	  ENMT: [ ] sore throat  [ ] thrush [ ] ulcers [ ] exudates  Respiratory: [ ] dyspnea [ ] hemoptysis [ ] cough [ ] sputum	  Cardiovascular:  [ ] chest pain [ ] palpitations [ ] edema	  Gastrointestinal:  [ ] nausea [ ] vomiting [ ] diarrhea [ ] constipation [ ] pain	  Genitourinary:  [ ] dysuria [ ] frequency [ ] hematuria [ ] discharge [ ] flank pain  [ ] incontinence  Musculoskeletal:  [ ] myalgias [ ] arthralgias [ ] arthritis  [ ] back pain  Neurological:  [X ] headache [ ] seizures  [ ] confusion/altered mental status  Psychiatric:  [ ] anxiety [ ] depression	  Hematology/Lymphatics:  [ ] lymphadenopathy  Endocrine:  [ ] adrenal [ ] thyroid  Allergic/Immunologic:	 [ ] transplant [ ] seasonal    Vital Signs Last 24 Hrs  T(F): 98.6 (21 @ 04:23), Max: 98.8 (21 @ 22:24)  Vital Signs Last 24 Hrs  HR: 84 (21 @ 04:23) (71 - 84)  BP: 130/91 (21 @ 04:23) (110/67 - 132/71)  RR: 18 (21 @ 04:23)  SpO2: 100% (21 @ 04:23) (100% - 100%)  Wt(kg): --    PHYSICAL EXAM:  Constitutional: non-toxic, no distress  HEAD/EYES: anicteric, no conjunctival injection  ENT:  supple, no thrush  Cardiovascular:   normal S1, S2, no murmur, no edema  Respiratory:  clear BS bilaterally, no wheezes, no rales  GI:  soft, non-tender, normal bowel sounds  :  NO CVA tenderness or suprapubic tenderness  Musculoskeletal:  no synovitis, normal ROM  Neurologic: awake and alert, normal strength, no focal findings  Skin:  no rash, no erythema, no phlebitis  Heme/Onc: no lymphadenopathy   Psychiatric:  awake, alert, appropriate mood                            12.0   4.58  )-----------( 236      ( 09 Aug 2021 06:37 )             35.6       135  |  98  |  17  ----------------------------<  115<H>  3.3<L>   |  25  |  0.68    Ca    9.3      09 Aug 2021 06:37  Phos  4.3       Mg     2.10         TPro  7.6  /  Alb  4.1  /  TBili  0.4  /  DBili  x   /  AST  16  /  ALT  19  /  AlkPhos  83      Urinalysis Basic - ( 08 Aug 2021 15:09 )    Color: Light Yellow / Appearance: Clear / S.010 / pH: x  Gluc: x / Ketone: Negative  / Bili: Negative / Urobili: <2 mg/dL   Blood: x / Protein: Negative / Nitrite: Negative   Leuk Esterase: Negative / RBC: x / WBC x   Sq Epi: x / Non Sq Epi: x / Bacteria: x    MICROBIOLOGY:          Rapid RVP Result: [employer] ( @ 23:39)      RADIOLOGY:  imaging below personally reviewed and agree with findings

## 2021-08-09 NOTE — CONSULT NOTE ADULT - ATTENDING COMMENTS
46 year old with esbl E coli uti and bacteremia    Can change to ertapenem 1 gram iv daily   through 8/14  can give at home via midline    No need for outpatient labs.    Follow up with her pmd or ID as needed.  [phone]

## 2021-08-10 VITALS
HEART RATE: 65 BPM | TEMPERATURE: 99 F | RESPIRATION RATE: 18 BRPM | SYSTOLIC BLOOD PRESSURE: 105 MMHG | DIASTOLIC BLOOD PRESSURE: 59 MMHG | OXYGEN SATURATION: 99 %

## 2021-08-10 LAB
ANION GAP SERPL CALC-SCNC: 14 MMOL/L — SIGNIFICANT CHANGE UP (ref 7–14)
BUN SERPL-MCNC: 18 MG/DL — SIGNIFICANT CHANGE UP (ref 7–23)
CALCIUM SERPL-MCNC: 9.2 MG/DL — SIGNIFICANT CHANGE UP (ref 8.4–10.5)
CHLORIDE SERPL-SCNC: 102 MMOL/L — SIGNIFICANT CHANGE UP (ref 98–107)
CO2 SERPL-SCNC: 24 MMOL/L — SIGNIFICANT CHANGE UP (ref 22–31)
CREAT SERPL-MCNC: 0.67 MG/DL — SIGNIFICANT CHANGE UP (ref 0.5–1.3)
CULTURE RESULTS: SIGNIFICANT CHANGE UP
GLUCOSE SERPL-MCNC: 110 MG/DL — HIGH (ref 70–99)
HCT VFR BLD CALC: 35.4 % — SIGNIFICANT CHANGE UP (ref 34.5–45)
HGB BLD-MCNC: 11.7 G/DL — SIGNIFICANT CHANGE UP (ref 11.5–15.5)
MAGNESIUM SERPL-MCNC: 2.2 MG/DL — SIGNIFICANT CHANGE UP (ref 1.6–2.6)
MCHC RBC-ENTMCNC: 27.8 PG — SIGNIFICANT CHANGE UP (ref 27–34)
MCHC RBC-ENTMCNC: 33.1 GM/DL — SIGNIFICANT CHANGE UP (ref 32–36)
MCV RBC AUTO: 84.1 FL — SIGNIFICANT CHANGE UP (ref 80–100)
NRBC # BLD: 0 /100 WBCS — SIGNIFICANT CHANGE UP
NRBC # FLD: 0 K/UL — SIGNIFICANT CHANGE UP
PHOSPHATE SERPL-MCNC: 3.7 MG/DL — SIGNIFICANT CHANGE UP (ref 2.5–4.5)
PLATELET # BLD AUTO: 250 K/UL — SIGNIFICANT CHANGE UP (ref 150–400)
POTASSIUM SERPL-MCNC: 3.4 MMOL/L — LOW (ref 3.5–5.3)
POTASSIUM SERPL-SCNC: 3.4 MMOL/L — LOW (ref 3.5–5.3)
RBC # BLD: 4.21 M/UL — SIGNIFICANT CHANGE UP (ref 3.8–5.2)
RBC # FLD: 12.5 % — SIGNIFICANT CHANGE UP (ref 10.3–14.5)
SODIUM SERPL-SCNC: 140 MMOL/L — SIGNIFICANT CHANGE UP (ref 135–145)
SPECIMEN SOURCE: SIGNIFICANT CHANGE UP
WBC # BLD: 4.38 K/UL — SIGNIFICANT CHANGE UP (ref 3.8–10.5)
WBC # FLD AUTO: 4.38 K/UL — SIGNIFICANT CHANGE UP (ref 3.8–10.5)

## 2021-08-10 PROCEDURE — 99232 SBSQ HOSP IP/OBS MODERATE 35: CPT

## 2021-08-10 RX ORDER — POTASSIUM CHLORIDE 20 MEQ
40 PACKET (EA) ORAL ONCE
Refills: 0 | Status: COMPLETED | OUTPATIENT
Start: 2021-08-10 | End: 2021-08-10

## 2021-08-10 RX ORDER — SODIUM CHLORIDE 9 MG/ML
10 INJECTION INTRAMUSCULAR; INTRAVENOUS; SUBCUTANEOUS
Qty: 80 | Refills: 0
Start: 2021-08-10 | End: 2021-08-13

## 2021-08-10 RX ORDER — ERTAPENEM SODIUM 1 G/1
1 INJECTION, POWDER, LYOPHILIZED, FOR SOLUTION INTRAMUSCULAR; INTRAVENOUS
Qty: 4 | Refills: 0
Start: 2021-08-10 | End: 2021-08-13

## 2021-08-10 RX ADMIN — Medication 40 MILLIEQUIVALENT(S): at 10:19

## 2021-08-10 RX ADMIN — ERTAPENEM SODIUM 120 MILLIGRAM(S): 1 INJECTION, POWDER, LYOPHILIZED, FOR SOLUTION INTRAMUSCULAR; INTRAVENOUS at 16:10

## 2021-08-10 NOTE — PROGRESS NOTE ADULT - ASSESSMENT
46F no significant history except for a recent UTI is here for ESBL E.coli bacteremia.     Problem/Plan - 1:  ·  Problem: E Coli ESBL Bacteremia.  Plan: - patient with a recent UTI is here for ESBL e.coli bacteremia   - likely due to a recent UTI  - repeat UA negative for infection  - urinary symptoms now resolved   - continue ertapenem   - repeat blood cultures x2 sent on 08/08   - ID consulted.   -Likely Mediport for home Abxs once rpt blood cultures negative.      Problem/Plan - 2:  ·  Problem: Hypokalemia.  Plan: - repleted  - recheck in AM.      Problem/Plan - 3:  ·  Problem: DVT prophylaxis.  Plan: - Lovenox for ppx.  
46 year old with esbl E coli uti and bacteremia    Can change to ertapenem 1 gram iv daily   through 8/14  can give at home via midline    No need for outpatient labs.  Okay to place midline      Follow up with her pmd or ID as needed.  [phone]  
46F no significant history except for a recent UTI is here for ESBL E.coli bacteremia.     Problem/Plan - 1:  ·  Problem: E Coli ESBL Bacteremia.  Plan: - patient with a recent UTI is here for ESBL e.coli bacteremia   - likely due to a recent UTI  - repeat UA negative for infection  - urinary symptoms now resolved   - continue ertapenem   - repeat blood cultures x2 sent on 08/08   - ID consulted.   -Likely Mediport for home Abxs once rpt blood cultures negative.      Problem/Plan - 2:  ·  Problem: Hypokalemia.  Plan: - repleted  - recheck in AM.      Problem/Plan - 3:  ·  Problem: DVT prophylaxis.  Plan: - Lovenox for ppx.

## 2021-08-10 NOTE — DISCHARGE NOTE PROVIDER - NSDCFUADDAPPT_GEN_ALL_CORE_FT
Follow up with your primary care physician for further monitoring in 1-2 weeks. Please call to arrange appointment. If you do not have a PCP, you may establish care at address above.   Follow up with Infectious Disease within 1-2 weeks of discharge.

## 2021-08-10 NOTE — DISCHARGE NOTE PROVIDER - NSFOLLOWUPCLINICS_GEN_ALL_ED_FT
[facility] at [place]  Internal Medicine  [address] [place] [address]  Phone: [phone]  Fax: [phone]    [facility] - Infectious Disease  Infectious Disease  [address] [place] [address], [place] [address]  Phone: [phone]  Fax:

## 2021-08-10 NOTE — DISCHARGE NOTE NURSING/CASE MANAGEMENT/SOCIAL WORK - PATIENT PORTAL LINK FT
You can access the FollowMyHealth Patient Portal offered by [facility] by registering at the following website: [email or web address]. By joining [facility]’s FollowMyHealth portal, you will also be able to view your health information using other applications (apps) compatible with our system.

## 2021-08-10 NOTE — DISCHARGE NOTE PROVIDER - NSDCMRMEDTOKEN_GEN_ALL_CORE_FT
INVanz ADD-Butler 1 g injection: 1 gram(s) intravenously once a day    ertapenem 1 g injection: 1 gram(s) intravenously once a day     1000mg in NaCL 0.9% 50 mL, IV intermittent q 24 hours, infuse over 30 minutes   Normal Saline Flush 0.9% injectable solution: 10 milliliter(s) injectable 2 times a day before and after IV anitbiotics

## 2021-08-10 NOTE — DISCHARGE NOTE PROVIDER - NSDCCPCAREPLAN_GEN_ALL_CORE_FT
PRINCIPAL DISCHARGE DIAGNOSIS  Diagnosis: Bacteremia  Assessment and Plan of Treatment: You were found to have an infection in the blood. You were started on IV antibiotics. You will be discharged with an IV to continue with antibiotics until 8/14. Please follow up with PCP and infectious disease as outpatient.      SECONDARY DISCHARGE DIAGNOSES  Diagnosis: Hypokalemia  Assessment and Plan of Treatment: Your potassium level was found to be low. You are to follow up with PCP for further monitoring of potassium levels as outpatient.

## 2021-08-10 NOTE — PROGRESS NOTE ADULT - SUBJECTIVE AND OBJECTIVE BOX
Date of Service  : 21     INTERVAL HPI/OVERNIGHT EVENTS: i feel fine.   Vital Signs Last 24 Hrs  T(C): 36.8 (09 Aug 2021 12:12), Max: 37 (09 Aug 2021 04:23)  T(F): 98.2 (09 Aug 2021 12:12), Max: 98.6 (09 Aug 2021 04:23)  HR: 65 (09 Aug 2021 12:12) (65 - 84)  BP: 112/78 (09 Aug 2021 12:12) (110/67 - 130/91)  BP(mean): --  RR: 17 (09 Aug 2021 12:12) (16 - 18)  SpO2: 100% (09 Aug 2021 12:12) (100% - 100%)  I&O's Summary    MEDICATIONS  (STANDING):  enoxaparin Injectable 40 milliGRAM(s) SubCutaneous daily  ertapenem  IVPB 1000 milliGRAM(s) IV Intermittent every 24 hours    MEDICATIONS  (PRN):  acetaminophen   Tablet .. 650 milliGRAM(s) Oral every 6 hours PRN Temp greater or equal to 38C (100.4F), Mild Pain (1 - 3)    LABS:                        12.0   4.58  )-----------( 236      ( 09 Aug 2021 06:37 )             35.6         135  |  98  |  17  ----------------------------<  115<H>  3.3<L>   |  25  |  0.68    Ca    9.3      09 Aug 2021 06:37  Phos  4.3       Mg     2.10         TPro  7.6  /  Alb  4.1  /  TBili  0.4  /  DBili  x   /  AST  16  /  ALT  19  /  AlkPhos  83  08-08      Urinalysis Basic - ( 08 Aug 2021 15:09 )    Color: Light Yellow / Appearance: Clear / S.010 / pH: x  Gluc: x / Ketone: Negative  / Bili: Negative / Urobili: <2 mg/dL   Blood: x / Protein: Negative / Nitrite: Negative   Leuk Esterase: Negative / RBC: x / WBC x   Sq Epi: x / Non Sq Epi: x / Bacteria: x      CAPILLARY BLOOD GLUCOSE            Urinalysis Basic - ( 08 Aug 2021 15:09 )    Color: Light Yellow / Appearance: Clear / S.010 / pH: x  Gluc: x / Ketone: Negative  / Bili: Negative / Urobili: <2 mg/dL   Blood: x / Protein: Negative / Nitrite: Negative   Leuk Esterase: Negative / RBC: x / WBC x   Sq Epi: x / Non Sq Epi: x / Bacteria: x      REVIEW OF SYSTEMS:  CONSTITUTIONAL: No fever, weight loss, or fatigue  EYES: No eye pain, visual disturbances, or discharge  ENMT:  No difficulty hearing, tinnitus, vertigo; No sinus or throat pain  NECK: No pain or stiffness  RESPIRATORY: No cough, wheezing, chills or hemoptysis; No shortness of breath  CARDIOVASCULAR: No chest pain, palpitations, dizziness, or leg swelling  GASTROINTESTINAL: No abdominal or epigastric pain. No nausea, vomiting, or hematemesis; No diarrhea or constipation. No melena or hematochezia.  GENITOURINARY: No dysuria, frequency, hematuria, or incontinence  NEUROLOGICAL: No headaches, memory loss, loss of strength, numbness, or tremors  SKIN: No itching, burning, rashes, or lesions   ENDOCRINE: No heat or cold intolerance; No hair loss  MUSCULOSKELETAL: No joint pain or swelling; No muscle, back, or extremity pain  PSYCHIATRIC: No depression, anxiety, mood swings, or difficulty sleeping  HEME/LYMPH: No easy bruising, or bleeding gums  ALLERY AND IMMUNOLOGIC: No hives or eczema    RADIOLOGY & ADDITIONAL TESTS:    Consultant(s) Notes Reviewed:  [x ] YES  [ ] NO    PHYSICAL EXAM:  GENERAL: NAD, well-groomed, well-developed,not in any distress ,  HEAD:  Atraumatic, Normocephalic  EYES: EOMI, PERRLA, conjunctiva and sclera clear  ENMT: No tonsillar erythema, exudates, or enlargement; Moist mucous membranes, Good dentition, No lesions  NECK: Supple, No JVD, Normal thyroid  NERVOUS SYSTEM:  Alert & Oriented X3, No focal deficit   CHEST/LUNG: Good air entry bilateral with no  rales, rhonchi, wheezing, or rubs  HEART: Regular rate and rhythm; No murmurs, rubs, or gallops  ABDOMEN: Soft, Nontender, Nondistended; Bowel sounds present  EXTREMITIES:  2+ Peripheral Pulses, No clubbing, cyanosis, or edema  SKIN: No rashes or lesions    Care Discussed with Consultants/Other Providers [ x] YES  [ ] NO
Follow Up:      Inverval History/ROS:Patient is a 46y old  Female who presents with a chief complaint of bacteremia (10 Aug 2021 13:32)    No fever  No events    Allergies    No Known Allergies    Intolerances        ANTIMICROBIALS:  ertapenem  IVPB 1000 every 24 hours      OTHER MEDS:  acetaminophen   Tablet .. 650 milliGRAM(s) Oral every 6 hours PRN  enoxaparin Injectable 40 milliGRAM(s) SubCutaneous daily      Vital Signs Last 24 Hrs  T(C): 37.1 (10 Aug 2021 13:16), Max: 37.1 (09 Aug 2021 16:50)  T(F): 98.8 (10 Aug 2021 13:16), Max: 98.8 (09 Aug 2021 16:50)  HR: 65 (10 Aug 2021 13:16) (65 - 82)  BP: 105/59 (10 Aug 2021 13:16) (105/59 - 129/84)  BP(mean): 99 (09 Aug 2021 16:50) (99 - 99)  RR: 18 (10 Aug 2021 13:16) (16 - 18)  SpO2: 99% (10 Aug 2021 13:16) (99% - 100%)    PHYSICAL EXAM:  General: [x ] non-toxic  HEAD/EYES: [ ] PERRL [x ] white sclera [ ] icterus  ENT:  [ ] normal [x ] supple [ ] thrush [ ] pharyngeal exudate  Cardiovascular:   [ ] murmur [x ] normal [ ] PPM/AICD  Respiratory:  [ x] clear to ausculation bilaterally  GI:  [x ] soft, non-tender, normal bowel sounds  :  [ ] lange [ ] no CVA tenderness   Musculoskeletal:  [ ] no synovitis  Neurologic:  [ ] non-focal exam   Skin:  [x ] no rash  Lymph: [ ] no lymphadenopathy  Psychiatric:  [ ] appropriate affect [x ] alert & oriented  Lines:  [ x] no phlebitis [ ] central line  x                              11.7   4.38  )-----------( 250      ( 10 Aug 2021 07:54 )             35.4       08-10    140  |  102  |  18  ----------------------------<  110<H>  3.4<L>   |  24  |  0.67    Ca    9.2      10 Aug 2021 07:54  Phos  3.7     08-10  Mg     2.20     08-10    TPro  7.6  /  Alb  4.1  /  TBili  0.4  /  DBili  x   /  AST  16  /  ALT  19  /  AlkPhos  83  08-08      Urinalysis Basic - ( 08 Aug 2021 15:09 )    Color: Light Yellow / Appearance: Clear / S.010 / pH: x  Gluc: x / Ketone: Negative  / Bili: Negative / Urobili: <2 mg/dL   Blood: x / Protein: Negative / Nitrite: Negative   Leuk Esterase: Negative / RBC: x / WBC x   Sq Epi: x / Non Sq Epi: x / Bacteria: x        MICROBIOLOGY:Culture Results:   No growth to date. (21 @ 16:31)  Culture Results:   No growth to date. (21 @ 16:31)  Culture Results:   <10,000 CFU/mL Normal Urogenital Jasmin (21 @ 14:45)      RADIOLOGY:    
Date of Service  : 08-10-21 @ 13:32    INTERVAL HPI/OVERNIGHT EVENTS: i feel fine.   Vital Signs Last 24 Hrs  T(C): 37.1 (10 Aug 2021 13:16), Max: 37.1 (09 Aug 2021 16:50)  T(F): 98.8 (10 Aug 2021 13:16), Max: 98.8 (09 Aug 2021 16:50)  HR: 65 (10 Aug 2021 13:16) (65 - 82)  BP: 105/59 (10 Aug 2021 13:16) (105/59 - 129/84)  BP(mean): 99 (09 Aug 2021 16:50) (99 - 99)  RR: 18 (10 Aug 2021 13:16) (16 - 18)  SpO2: 99% (10 Aug 2021 13:16) (99% - 100%)  I&O's Summary    MEDICATIONS  (STANDING):  enoxaparin Injectable 40 milliGRAM(s) SubCutaneous daily  ertapenem  IVPB 1000 milliGRAM(s) IV Intermittent every 24 hours    MEDICATIONS  (PRN):  acetaminophen   Tablet .. 650 milliGRAM(s) Oral every 6 hours PRN Temp greater or equal to 38C (100.4F), Mild Pain (1 - 3)    LABS:                        11.7   4.38  )-----------( 250      ( 10 Aug 2021 07:54 )             35.4     08-10    140  |  102  |  18  ----------------------------<  110<H>  3.4<L>   |  24  |  0.67    Ca    9.2      10 Aug 2021 07:54  Phos  3.7     08-10  Mg     2.20     08-10    TPro  7.6  /  Alb  4.1  /  TBili  0.4  /  DBili  x   /  AST  16  /  ALT  19  /  AlkPhos  83  08-08      Urinalysis Basic - ( 08 Aug 2021 15:09 )    Color: Light Yellow / Appearance: Clear / S.010 / pH: x  Gluc: x / Ketone: Negative  / Bili: Negative / Urobili: <2 mg/dL   Blood: x / Protein: Negative / Nitrite: Negative   Leuk Esterase: Negative / RBC: x / WBC x   Sq Epi: x / Non Sq Epi: x / Bacteria: x      CAPILLARY BLOOD GLUCOSE            Urinalysis Basic - ( 08 Aug 2021 15:09 )    Color: Light Yellow / Appearance: Clear / S.010 / pH: x  Gluc: x / Ketone: Negative  / Bili: Negative / Urobili: <2 mg/dL   Blood: x / Protein: Negative / Nitrite: Negative   Leuk Esterase: Negative / RBC: x / WBC x   Sq Epi: x / Non Sq Epi: x / Bacteria: x      REVIEW OF SYSTEMS:  CONSTITUTIONAL: No fever, weight loss, or fatigue  EYES: No eye pain, visual disturbances, or discharge  ENMT:  No difficulty hearing, tinnitus, vertigo; No sinus or throat pain  NECK: No pain or stiffness  RESPIRATORY: No cough, wheezing, chills or hemoptysis; No shortness of breath  CARDIOVASCULAR: No chest pain, palpitations, dizziness, or leg swelling  GASTROINTESTINAL: No abdominal or epigastric pain. No nausea, vomiting, or hematemesis; No diarrhea or constipation. No melena or hematochezia.  GENITOURINARY: No dysuria, frequency, hematuria, or incontinence  NEUROLOGICAL: No headaches, memory loss, loss of strength, numbness, or tremors      Consultant(s) Notes Reviewed:  [x ] YES  [ ] NO    PHYSICAL EXAM:  GENERAL: NAD, well-groomed, well-developed, not in any distress ,  HEAD:  Atraumatic, Normocephalic  EYES: EOMI, PERRLA, conjunctiva and sclera clear  ENMT: No tonsillar erythema, exudates, or enlargement; Moist mucous membranes, Good dentition, No lesions  NECK: Supple, No JVD, Normal thyroid  NERVOUS SYSTEM:  Alert & Oriented X3, No focal deficit   CHEST/LUNG: Good air entry bilateral with no  rales, rhonchi, wheezing, or rubs  HEART: Regular rate and rhythm; No murmurs, rubs, or gallops  ABDOMEN: Soft, Nontender, Nondistended; Bowel sounds present  EXTREMITIES:  2+ Peripheral Pulses, No clubbing, cyanosis, or edema  SKIN: No rashes or lesions    Care Discussed with Consultants/Other Providers [ x] YES  [ ] NO

## 2021-08-10 NOTE — CHART NOTE - NSCHARTNOTEFT_GEN_A_CORE
Spoke with ID, ok to place midline today  IV RN called  CM aware, given rx for ertapenem and NS flushes    Discussed with attending Dr. Cassie Feliciano, PA-C  Department of Medicine  Pager [phone]

## 2021-08-10 NOTE — DISCHARGE NOTE PROVIDER - CARE PROVIDER_API CALL
ALISTAIR ENRIQUE  Internal Medicine  [address] [place] [address]  Phone: [phone]  Fax: ()-  Follow Up Time:

## 2021-08-10 NOTE — DISCHARGE NOTE PROVIDER - HOSPITAL COURSE
46F no significant history except for a recent UTI is here for ESBL E.coli bacteremia.    E Coli ESBL Bacteremia  - patient with a recent UTI is here for ESBL e.coli bacteremia   - likely due to a recent UTI  - repeat UA negative for infection  - urinary symptoms now resolved   - continue ertapenem   - repeat blood cultures x2 sent on 08/08 NGTD  - Seen and evaluated by Infectious Disease   - Midline placed on 8/10 (ok as per ID)  - Pt will continue IV ertapenam until 8/14 as per ID     Hypokalemia.   - repleted  - Pt to follow up with PCP for further monitoring of potassium levels as outpatient    DVT prophylaxis.   - Lovenox for ppx.    On 8/10/2021, discussed with Dr. Matthews, patient is medically cleared and optimized for discharge today. All medications were reviewed with attending, and sent to mutually agreed upon pharmacy.

## 2021-08-13 LAB
CULTURE RESULTS: SIGNIFICANT CHANGE UP
CULTURE RESULTS: SIGNIFICANT CHANGE UP
SPECIMEN SOURCE: SIGNIFICANT CHANGE UP
SPECIMEN SOURCE: SIGNIFICANT CHANGE UP

## 2023-03-09 ENCOUNTER — EMERGENCY (EMERGENCY)
Facility: HOSPITAL | Age: 48
LOS: 1 days | Discharge: HOME | End: 2023-03-09
Attending: EMERGENCY MEDICINE | Admitting: EMERGENCY MEDICINE
Payer: MEDICAID

## 2023-03-09 VITALS
RESPIRATION RATE: 16 BRPM | OXYGEN SATURATION: 98 % | SYSTOLIC BLOOD PRESSURE: 153 MMHG | TEMPERATURE: 99 F | HEART RATE: 86 BPM | DIASTOLIC BLOOD PRESSURE: 89 MMHG

## 2023-03-09 DIAGNOSIS — Z98.891 HISTORY OF UTERINE SCAR FROM PREVIOUS SURGERY: Chronic | ICD-10-CM

## 2023-03-09 LAB
ALBUMIN SERPL ELPH-MCNC: 4.1 G/DL — SIGNIFICANT CHANGE UP (ref 3.3–5)
ALP SERPL-CCNC: 78 U/L — SIGNIFICANT CHANGE UP (ref 40–120)
ALT FLD-CCNC: 10 U/L — SIGNIFICANT CHANGE UP (ref 4–33)
ANION GAP SERPL CALC-SCNC: 10 MMOL/L — SIGNIFICANT CHANGE UP (ref 7–14)
AST SERPL-CCNC: 15 U/L — SIGNIFICANT CHANGE UP (ref 4–32)
BASOPHILS # BLD AUTO: 0.01 K/UL — SIGNIFICANT CHANGE UP (ref 0–0.2)
BASOPHILS NFR BLD AUTO: 0.2 % — SIGNIFICANT CHANGE UP (ref 0–2)
BILIRUB SERPL-MCNC: 0.3 MG/DL — SIGNIFICANT CHANGE UP (ref 0.2–1.2)
BUN SERPL-MCNC: 18 MG/DL — SIGNIFICANT CHANGE UP (ref 7–23)
CALCIUM SERPL-MCNC: 9.1 MG/DL — SIGNIFICANT CHANGE UP (ref 8.4–10.5)
CHLORIDE SERPL-SCNC: 100 MMOL/L — SIGNIFICANT CHANGE UP (ref 98–107)
CO2 SERPL-SCNC: 26 MMOL/L — SIGNIFICANT CHANGE UP (ref 22–31)
CREAT SERPL-MCNC: 0.74 MG/DL — SIGNIFICANT CHANGE UP (ref 0.5–1.3)
EGFR: 101 ML/MIN/1.73M2 — SIGNIFICANT CHANGE UP
EOSINOPHIL # BLD AUTO: 0.05 K/UL — SIGNIFICANT CHANGE UP (ref 0–0.5)
EOSINOPHIL NFR BLD AUTO: 1 % — SIGNIFICANT CHANGE UP (ref 0–6)
GLUCOSE SERPL-MCNC: 106 MG/DL — HIGH (ref 70–99)
HCG SERPL-ACNC: <5 MIU/ML — SIGNIFICANT CHANGE UP
HCT VFR BLD CALC: 36.5 % — SIGNIFICANT CHANGE UP (ref 34.5–45)
HGB BLD-MCNC: 12.1 G/DL — SIGNIFICANT CHANGE UP (ref 11.5–15.5)
IANC: 2.48 K/UL — SIGNIFICANT CHANGE UP (ref 1.8–7.4)
IMM GRANULOCYTES NFR BLD AUTO: 0 % — SIGNIFICANT CHANGE UP (ref 0–0.9)
LYMPHOCYTES # BLD AUTO: 1.96 K/UL — SIGNIFICANT CHANGE UP (ref 1–3.3)
LYMPHOCYTES # BLD AUTO: 40.1 % — SIGNIFICANT CHANGE UP (ref 13–44)
MCHC RBC-ENTMCNC: 27.1 PG — SIGNIFICANT CHANGE UP (ref 27–34)
MCHC RBC-ENTMCNC: 33.2 GM/DL — SIGNIFICANT CHANGE UP (ref 32–36)
MCV RBC AUTO: 81.7 FL — SIGNIFICANT CHANGE UP (ref 80–100)
MONOCYTES # BLD AUTO: 0.39 K/UL — SIGNIFICANT CHANGE UP (ref 0–0.9)
MONOCYTES NFR BLD AUTO: 8 % — SIGNIFICANT CHANGE UP (ref 2–14)
NEUTROPHILS # BLD AUTO: 2.48 K/UL — SIGNIFICANT CHANGE UP (ref 1.8–7.4)
NEUTROPHILS NFR BLD AUTO: 50.7 % — SIGNIFICANT CHANGE UP (ref 43–77)
NRBC # BLD: 0 /100 WBCS — SIGNIFICANT CHANGE UP (ref 0–0)
NRBC # FLD: 0 K/UL — SIGNIFICANT CHANGE UP (ref 0–0)
PLATELET # BLD AUTO: 184 K/UL — SIGNIFICANT CHANGE UP (ref 150–400)
POTASSIUM SERPL-MCNC: 3.4 MMOL/L — LOW (ref 3.5–5.3)
POTASSIUM SERPL-SCNC: 3.4 MMOL/L — LOW (ref 3.5–5.3)
PROT SERPL-MCNC: 7.1 G/DL — SIGNIFICANT CHANGE UP (ref 6–8.3)
RBC # BLD: 4.47 M/UL — SIGNIFICANT CHANGE UP (ref 3.8–5.2)
RBC # FLD: 13 % — SIGNIFICANT CHANGE UP (ref 10.3–14.5)
SODIUM SERPL-SCNC: 136 MMOL/L — SIGNIFICANT CHANGE UP (ref 135–145)
TROPONIN T, HIGH SENSITIVITY RESULT: <6 NG/L — SIGNIFICANT CHANGE UP
TSH SERPL-MCNC: 1.96 UIU/ML — SIGNIFICANT CHANGE UP (ref 0.27–4.2)
WBC # BLD: 4.89 K/UL — SIGNIFICANT CHANGE UP (ref 3.8–10.5)
WBC # FLD AUTO: 4.89 K/UL — SIGNIFICANT CHANGE UP (ref 3.8–10.5)

## 2023-03-09 PROCEDURE — 99285 EMERGENCY DEPT VISIT HI MDM: CPT

## 2023-03-09 PROCEDURE — 71046 X-RAY EXAM CHEST 2 VIEWS: CPT | Mod: 26

## 2023-03-09 NOTE — ED PROVIDER NOTE - NS ED ROS FT
Const: Denies fever, chills, weight loss  Resp: Denies coughing, SOB  Cardiovascular: + intermittent palpitations, Denies CP  GI: Denies nausea, vomiting, abdominal pain, diarrhea,  Endo: denies hot/cold flashes, thinning of hair  MSK: Denies back pain  Neuro: Denies HA, dizziness, numbness, weakness  Skin: Denies rashes Const: Denies fever, chills, weight loss  Resp: Denies coughing, SOB  Cardiovascular: + intermittent palpitations, Denies CP, denies leg swelling   GI: Denies nausea, vomiting, abdominal pain, diarrhea,  Endo: denies hot/cold flashes, thinning of hair  MSK: Denies back pain  Neuro: Denies HA, dizziness, numbness, weakness  Skin: Denies rashes

## 2023-03-09 NOTE — ED PROVIDER NOTE - NSFOLLOWUPINSTRUCTIONS_ED_ALL_ED_FT
Heart Palpitations    WHAT YOU NEED TO KNOW:    Heart palpitations are feelings that your heart races, jumps, throbs, or flutters. You may feel extra beats, no beats for a short time, or skipped beats. You may have these feelings in your chest, throat, or neck. They may happen when you are sitting, standing, or lying. Heart palpitations may be frightening, but are usually not caused by a serious problem.    DISCHARGE INSTRUCTIONS:    Call 911 or have someone else call for any of the following:    You have any of the following signs of a heart attack:  Squeezing, pressure, or pain in your chest    You may also have any of the following:  Discomfort or pain in your back, neck, jaw, stomach, or arm    Shortness of breath    Nausea or vomiting    Lightheadedness or a sudden cold sweat    You have any of the following signs of a stroke:  Numbness or drooping on one side of your face    Weakness in an arm or leg    Confusion or difficulty speaking    Dizziness, a severe headache, or vision loss    You faint or lose consciousness.  Return to the emergency department if:    Your palpitations happen more often or get more intense.    Contact your healthcare provider if:    You have new or worsening swelling in your feet or ankles.    You have questions or concerns about your condition or care.  Follow up with your healthcare provider as directed: You may need to follow up with a cardiologist. You may need tests to check for heart problems that cause palpitations. Write down your questions so you remember to ask them during your visits.    Keep a record: Write down when your palpitations start and stop, what you were doing when they started, and your symptoms. Keep track of what you ate or drank within a few hours of your palpitations. Include anything that seemed to help your symptoms, such as lying down or holding your breath. This record will help you and your healthcare provider learn what triggers your palpitations. Bring this record with you to your follow up visits.    Help prevent heart palpitations:    Manage stress and anxiety. Find ways to relax such as listening to music, meditating, or doing yoga. Exercise can also help decrease stress and anxiety. Talk to someone you trust about your stress or anxiety. You can also talk to a therapist.    Get plenty of sleep every night. Ask your healthcare provider how much sleep you need each night.    Do not drink caffeine or alcohol. Caffeine and alcohol can make your palpitations worse. Caffeine is found in soda, coffee, tea, chocolate, and drinks that increase your energy.    Do not smoke. Nicotine and other chemicals in cigarettes and cigars may damage your heart and blood vessels. Ask your healthcare provider for information if you currently smoke and need help to quit. E-cigarettes or smokeless tobacco still contain nicotine. Talk to your healthcare provider before you use these products.    Do not use illegal drugs. Talk to your healthcare provider if you use illegal drugs and want help to quit.

## 2023-03-09 NOTE — ED ADULT NURSE NOTE - NSICDXPASTSURGICALHX_GEN_ALL_CORE_FT
"Pt calls in with complaints of >  Thursday evening temp 102.0 oral  Took motrin/tylenol alternating    Friday morning - says fever continued  Stayed in bed most of day     Today Saturday > temp at 1030 am > 101 again oral     No cough   No congestion   Urinating \" ok\"    But does complain of lower back pain rad to right   Rates 6/10     Per protocol pt advised to be seen by a Provider within 4 hours     Pt will go to the [facility] ED for evaluation     Protocol and care advice reviewed  Caller states understanding of the recommended disposition    Advised to call back if further questions or concerns    Marlon Acevedo RN / [facility]                Reason for Disposition    [1] Fever > 100.0 F (37.8 C) AND [2] flank pain (i.e., in side, below ribs and above hip)    Additional Information    Negative: Major injury to the back (e.g., MVA, fall > 10 feet or 3 meters, penetrating injury, etc.)    Negative: Followed a tailbone injury    Negative: [1] Pain in the upper back over the ribs (rib cage) AND [2] radiates (travels, goes) into chest    Negative: [1] Pain in the upper back over the ribs (rib cage) AND [2] worsened by coughing (or clearly increases with breathing)    Negative: Back pain during pregnancy    Negative: Pain mainly in flank (i.e., in the side, over the lower ribs or just below the ribs)    Negative: [1] SEVERE back pain (e.g., excruciating) AND [2] sudden onset AND [3] age > 60    Negative: [1] Unable to urinate (or only a few drops) > 4 hours AND     [2] bladder feels very full (e.g., palpable bladder or strong urge to urinate)    Negative: [1] Urinary or bowel incontinence (i.e., loss of bladder or bowel control) AND [2] new onset    Negative: Numbness in groin or rectal area (i.e., loss of sensation)    Protocols used: BACK PAIN-A-AH      " PAST SURGICAL HISTORY:  History of

## 2023-03-09 NOTE — ED PROVIDER NOTE - PROGRESS NOTE DETAILS
Angela Card, PGY1, MD: Reviewed results of all labs and imaging, all results nonactionable at this time.  Reviewed continuous telemetry monitoring, no arrhythmias detected on telemetry while in ED at this time.  Discussed all results with patient at bedside, informed patient that she needs to follow-up with cardiology for Holter monitoring for possible arrhythmias.  Patient understands and agrees with plan.  All questions answered at this time.  Patient ready for discharge.

## 2023-03-09 NOTE — ED PROVIDER NOTE - PHYSICAL EXAMINATION
CONSTITUTIONAL: non-traumatic, no apparent distress  EYES: PERRLA, EOMI, No conjunctival or scleral injection, non-icteric  ENMT: Thyroid moves normally with swallowing. No thyroid tenderness.      RESP: No respiratory distress, CTA b/l  CV: RRR, +S1S2, no JVD  GI: Soft, NT, ND, no rebound, no guarding  SKIN: No rashes noted   NEURO:  A+O x 3  EXT: no lower extremity edema.   PSYCH: Appropriate insight/judgment; mood and affect appropriate CONSTITUTIONAL: non-traumatic, no apparent distress  EYES: PERRLA, EOMI, normal conjunctiva, sclera non-icteric  ENMT: Thyroid moves normally with swallowing. No thyroid tenderness.  No visible goiters. Neck supple.   RESP: No respiratory distress, CTA b/l, no wheezes   CV: RRR, +S1S2, no JVD  GI: Soft, NT, ND, no rebound, no guarding  SKIN: skin warm and well perfused, No rashes noted   NEURO: no gross focal neuro deficits,  A+O x 3  EXT: no lower extremity edema. calves nontender   PSYCH: Appropriate insight/judgment; mood and affect appropriate

## 2023-03-09 NOTE — ED PROVIDER NOTE - OBJECTIVE STATEMENT
48 year old F with PMHx HTN presenting with concern of her "heart going very fast randomly."  Patient states that it started about a week ago, comes at random times, and lasts a few seconds at a time.  She states that nothing similar has happened before in the past.  She denies any associated shortness of breath, hot/cold flashes, n/v, or thinning of her hair.  She reports that she has been evaluated in the past for 3 thyroid nodules, and states that they "were the same" when she had follow up.  Pt denies a history of anemia, and states that she no longer has menstrual cycles because of an IUD.  Patient has noticed some weight gain, but attributes this to lack of exercise.  Denies any fever or chills.  Denies alcohol or other substance use.  States she has a follow up with her PCP in 1 week (Mar 16th). 48 year old F with PMHx HTN presenting with racing palpitations and occasional feeling of skipped heart beats x 1 week. Pt reports that the palpitations comes at random times, and lasts a few seconds at a time.  Pt denies prior episodes or previous similar sx. She denies any associated shortness of breath, hot/cold flashes, n/v, weight loss, or thinning of her hair.  Pt has a hx of 3 asymptomatic thyroid nodules that were under annual monitoring which was discontinued some time ago. Pt denies a history of anemia, dizziness, le leg swelling, pt is Amenorrheic 2/2 mirena IUD.  Patient has noticed some weight gain, but attributes this to lack of exercise.  Denies any fever or chills.  Denies alcohol or other substance use.  States she has a follow up with her PCP in 1 week (Mar 16th).

## 2023-03-09 NOTE — ED PROVIDER NOTE - NSFOLLOWUPCLINICS_GEN_ALL_ED_FT
[facility]  Cardiology  [address] [place] [id number]  Phone: [phone]  Fax:     [facility]  Cardiology  [address], [address]  [place] [address]  Phone: [phone]  Fax:

## 2023-03-09 NOTE — ED PROVIDER NOTE - ATTENDING CONTRIBUTION TO CARE
Agree with resident note  48-year-old female with past medical history of hypertension presents with heart palpitations for 1 week.  States comes at various times is intermittent in nature lasting only a few seconds each time.  Patient denies prior history of this in the past.  Patient has a history of thyroid nodules.  Patient denies shortness of breath, nausea, vomiting, dizziness, anemia.  Physical exam  Gen: pt well appearing in no respiratory distress  vital signs stable  NCAT  Lungs: CTAB/L  Cardiac: s1 s2 no m/r/g  abdomen: soft/NT/ND  ext: no edema  Neuro: CNs intact 5/5 motor UE and LE; sensation intact; gait stable  skin: no rash  EKG: Normal sinus rhythm with sinus arrhythmia  Impression palpitations we will check basic labs including thyroid function test as well as beta-hCG  On reassessment TSH within normal limits troponin within normal limits reviewed telemetry monitor no events  We will patient can follow-up with cardiologist  Stable for discharge Agree with student and resident note  48-year-old female with past medical history of hypertension presents with heart palpitations for 1 week.  States comes at various times is intermittent in nature lasting only a few seconds each time.  Patient denies prior history of this in the past.  Patient has a history of thyroid nodules.  Patient denies shortness of breath, nausea, vomiting, dizziness, anemia.  Physical exam  Gen: pt well appearing in no respiratory distress  vital signs stable  NCAT  Lungs: CTAB/L  Cardiac: s1 s2 no m/r/g  abdomen: soft/NT/ND  ext: no edema  Neuro: CNs intact 5/5 motor UE and LE; sensation intact; gait stable  skin: no rash  EKG: Normal sinus rhythm with sinus arrhythmia  Impression palpitations we will check basic labs including thyroid function test as well as beta-hCG  On reassessment TSH within normal limits troponin within normal limits reviewed telemetry monitor no events  We will patient can follow-up with cardiologist  Stable for discharge

## 2023-03-09 NOTE — ED PROVIDER NOTE - PATIENT PORTAL LINK FT
You can access the FollowMyHealth Patient Portal offered by [facility] by registering at the following website: [email or web address]. By joining [facility]’s FollowMyHealth portal, you will also be able to view your health information using other applications (apps) compatible with our system.

## 2023-03-09 NOTE — ED PROVIDER NOTE - CLINICAL SUMMARY MEDICAL DECISION MAKING FREE TEXT BOX
48 year old F with a history HTN presenting with palpitations.  States that it comes randomly and lasts a few seconds.  No associated SOB, heat/cold intolerance, or weight loss.  Hx thyroid nodules, stable per patient.  Denies substance use. Differential includes hyperthyroid, ACS, anemia, stress/anxiety, pregnancy, among others.  Will eval further with CBC, CMP, hcg, thyroid function tests, troponin, EKG and CXR. Has f/u with PCP on Mar 16th. 48 year old F with a history HTN presenting with intermittent racing and skipped beat palpitations without known trigger.  No associated SOB, heat/cold intolerance, or weight loss.  Hx thyroid nodules, stable per patient.  Denies substance use. Afebrile, hemodynamically stable without visible goiter, palpable thyroid nodules, without palpable thyroid tenderness on exam.  Heart rate regular, lungs clear to auscultation, no conjunctival pallor on exam.      Differential includes but if not limited to hyperthyroidism/hypothyroidism, ACS vs arrhythmia,  anemia, stress/anxiety, pregnancy.  Will eval further with CBC, CMP, hcg, thyroid function tests, troponin, EKG and CXR. Has f/u with PCP on Mar 16th. Pt may need cardiology referral for possible holter monitoring.

## 2023-03-09 NOTE — ED ADULT NURSE NOTE - OBJECTIVE STATEMENT
Patient received to intake 4, A/Ox4, ambulatory, c/o palpitations. Patient states she has had intermittent palpitations x 1 week. States episodes last a few seconds. Denies chest pain, SOB, N/V, and syncope. 20G IV placed to right hand. Placed on cardiac monitor, NSR. Safety maintained.

## 2023-06-07 NOTE — ED ADULT TRIAGE NOTE - NS ED NURSE DIRECT TO ROOM YN
Metamucil 1tbsp in 8oz water daily.   RN phone follow up in 2 weeks. Consideration for trial of dicyclomine pending clinical course.   Recommend surveillance colonoscopy  March 2024.      Metamucil  Put fiber powder into an empty glass and mix with 8 oz of water or other cool liquid. Stir briskly and drink promptly.     New Users: You may initially experience changes in bowel habits or minor bloating as your body adjusts to increased fiber intake.    Bulk-forming fibers like psyllium husk may affect how well medicines work. Take this product at least two hours before or after medicines..       No

## 2023-08-02 NOTE — ED PROVIDER NOTE - CROS ED GI ALL NEG
Discontinue Regimen: Tacrolimus Plan: Will consider punch biopsy at follow up if not improved Render In Strict Bullet Format?: No Detail Level: Zone Initiate Treatment: Apply nystatin mixed with cicalfate to the affected areas BID or 4 weeks negative...
